# Patient Record
Sex: FEMALE | Race: BLACK OR AFRICAN AMERICAN | NOT HISPANIC OR LATINO | Employment: FULL TIME | URBAN - METROPOLITAN AREA
[De-identification: names, ages, dates, MRNs, and addresses within clinical notes are randomized per-mention and may not be internally consistent; named-entity substitution may affect disease eponyms.]

---

## 2020-01-07 ENCOUNTER — TELEPHONE (OUTPATIENT)
Dept: INTERNAL MEDICINE | Facility: CLINIC | Age: 35
End: 2020-01-07

## 2020-01-07 ENCOUNTER — OFFICE VISIT (OUTPATIENT)
Dept: INTERNAL MEDICINE | Facility: CLINIC | Age: 35
End: 2020-01-07
Payer: COMMERCIAL

## 2020-01-07 VITALS
SYSTOLIC BLOOD PRESSURE: 126 MMHG | WEIGHT: 170.88 LBS | DIASTOLIC BLOOD PRESSURE: 84 MMHG | HEART RATE: 67 BPM | OXYGEN SATURATION: 98 % | BODY MASS INDEX: 28.47 KG/M2 | HEIGHT: 65 IN | TEMPERATURE: 99 F

## 2020-01-07 DIAGNOSIS — Z00.00 ANNUAL PHYSICAL EXAM: Primary | ICD-10-CM

## 2020-01-07 LAB
BILIRUB UR QL STRIP: NEGATIVE
CLARITY UR REFRACT.AUTO: CLEAR
COLOR UR AUTO: YELLOW
GLUCOSE UR QL STRIP: NEGATIVE
HGB UR QL STRIP: NEGATIVE
KETONES UR QL STRIP: NEGATIVE
LEUKOCYTE ESTERASE UR QL STRIP: NEGATIVE
MICROSCOPIC COMMENT: NORMAL
NITRITE UR QL STRIP: NEGATIVE
PH UR STRIP: 7 [PH] (ref 5–8)
PROT UR QL STRIP: NEGATIVE
SP GR UR STRIP: 1.02 (ref 1–1.03)
URN SPEC COLLECT METH UR: NORMAL

## 2020-01-07 PROCEDURE — 99385 PR PREVENTIVE VISIT,NEW,18-39: ICD-10-PCS | Mod: S$GLB,,, | Performed by: INTERNAL MEDICINE

## 2020-01-07 PROCEDURE — 81001 URINALYSIS AUTO W/SCOPE: CPT

## 2020-01-07 PROCEDURE — 99999 PR PBB SHADOW E&M-NEW PATIENT-LVL III: ICD-10-PCS | Mod: PBBFAC,,, | Performed by: INTERNAL MEDICINE

## 2020-01-07 PROCEDURE — 99999 PR PBB SHADOW E&M-NEW PATIENT-LVL III: CPT | Mod: PBBFAC,,, | Performed by: INTERNAL MEDICINE

## 2020-01-07 PROCEDURE — 99385 PREV VISIT NEW AGE 18-39: CPT | Mod: S$GLB,,, | Performed by: INTERNAL MEDICINE

## 2020-01-07 RX ORDER — ETONOGESTREL AND ETHINYL ESTRADIOL VAGINAL RING .015; .12 MG/D; MG/D
1 RING VAGINAL
COMMUNITY
End: 2022-10-26 | Stop reason: CLARIF

## 2020-01-07 NOTE — PROGRESS NOTES
CC:  Annual exam    HPI:  The patient is a 34-year-old female comes today for annual physical exam.  Will not seen for several years.  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  Does report being under a great deal of stress.  She has reduced her hours.  Family life should improve the and basketball season.    ROS:  She does report some weight gain.  She did have her eyes checked 1 month ago at [facility].  No auditory changes.  No dysphagia.  No chest pain.  Her shortness of breath.  No nausea or vomiting.  No abdominal pain. No bowel changes.  No bladder changes.  No weakness in the arms or legs.  No skin changes.  No breast changes.  No numbness tingling in arms or legs.  She does report that she was treated urgent care for UTI.  They did see blood in her urine and was told to follow up on this her    Physical exam:  General appearance no acute distress  HEENT:  Conjunctiva is clear.  Pupils equal reactive.  TMs are clear.  Nasal septum is midline without discharge. Oropharynx is without erythema.  Trachea is midline without JVD. Without thyromegaly.  Pulmonary:  Good inspiratory, expiratory breath sounds are heard.  Lungs are clear to auscultation.  Cardiovascular:  S1-S2, rhythm appears to be normal.  2+ carotid pulses bruits.  Extremities without edema.  Abdomen was nontender, nondistended without hepatosplenomegaly.    Assessment:  1.  Annual exam  2.  History of UTI with hematuria.    Plan:  1.  Will schedule a CBC, CMP, lipid panel, UA

## 2020-01-07 NOTE — TELEPHONE ENCOUNTER
----- Message from Vicki Nogueira sent at 1/7/2020  9:16 AM CST -----  Patient went to main campus but should be here shortly.    Thank you

## 2020-01-13 ENCOUNTER — LAB VISIT (OUTPATIENT)
Dept: LAB | Facility: HOSPITAL | Age: 35
End: 2020-01-13
Attending: INTERNAL MEDICINE
Payer: COMMERCIAL

## 2020-01-13 DIAGNOSIS — Z00.00 ANNUAL PHYSICAL EXAM: ICD-10-CM

## 2020-01-13 LAB
ALBUMIN SERPL BCP-MCNC: 3.9 G/DL (ref 3.5–5.2)
ALP SERPL-CCNC: 54 U/L (ref 55–135)
ALT SERPL W/O P-5'-P-CCNC: 11 U/L (ref 10–44)
ANION GAP SERPL CALC-SCNC: 9 MMOL/L (ref 8–16)
AST SERPL-CCNC: 14 U/L (ref 10–40)
BASOPHILS # BLD AUTO: 0.01 K/UL (ref 0–0.2)
BASOPHILS NFR BLD: 0.2 % (ref 0–1.9)
BILIRUB SERPL-MCNC: 0.8 MG/DL (ref 0.1–1)
BUN SERPL-MCNC: 18 MG/DL (ref 6–20)
CALCIUM SERPL-MCNC: 9.1 MG/DL (ref 8.7–10.5)
CHLORIDE SERPL-SCNC: 105 MMOL/L (ref 95–110)
CHOLEST SERPL-MCNC: 172 MG/DL (ref 120–199)
CHOLEST/HDLC SERPL: 2.6 {RATIO} (ref 2–5)
CO2 SERPL-SCNC: 24 MMOL/L (ref 23–29)
CREAT SERPL-MCNC: 1 MG/DL (ref 0.5–1.4)
DIFFERENTIAL METHOD: ABNORMAL
EOSINOPHIL # BLD AUTO: 0 K/UL (ref 0–0.5)
EOSINOPHIL NFR BLD: 0.5 % (ref 0–8)
ERYTHROCYTE [DISTWIDTH] IN BLOOD BY AUTOMATED COUNT: 12.2 % (ref 11.5–14.5)
EST. GFR  (AFRICAN AMERICAN): >60 ML/MIN/1.73 M^2
EST. GFR  (NON AFRICAN AMERICAN): >60 ML/MIN/1.73 M^2
GLUCOSE SERPL-MCNC: 83 MG/DL (ref 70–110)
HCT VFR BLD AUTO: 42.3 % (ref 37–48.5)
HDLC SERPL-MCNC: 66 MG/DL (ref 40–75)
HDLC SERPL: 38.4 % (ref 20–50)
HGB BLD-MCNC: 13.5 G/DL (ref 12–16)
IMM GRANULOCYTES # BLD AUTO: 0.01 K/UL (ref 0–0.04)
IMM GRANULOCYTES NFR BLD AUTO: 0.2 % (ref 0–0.5)
LDLC SERPL CALC-MCNC: 85.2 MG/DL (ref 63–159)
LYMPHOCYTES # BLD AUTO: 2 K/UL (ref 1–4.8)
LYMPHOCYTES NFR BLD: 33.1 % (ref 18–48)
MCH RBC QN AUTO: 29.9 PG (ref 27–31)
MCHC RBC AUTO-ENTMCNC: 31.9 G/DL (ref 32–36)
MCV RBC AUTO: 94 FL (ref 82–98)
MONOCYTES # BLD AUTO: 0.4 K/UL (ref 0.3–1)
MONOCYTES NFR BLD: 7.1 % (ref 4–15)
NEUTROPHILS # BLD AUTO: 3.6 K/UL (ref 1.8–7.7)
NEUTROPHILS NFR BLD: 58.9 % (ref 38–73)
NONHDLC SERPL-MCNC: 106 MG/DL
NRBC BLD-RTO: 0 /100 WBC
PLATELET # BLD AUTO: 406 K/UL (ref 150–350)
PMV BLD AUTO: 10.6 FL (ref 9.2–12.9)
POTASSIUM SERPL-SCNC: 3.9 MMOL/L (ref 3.5–5.1)
PROT SERPL-MCNC: 7.5 G/DL (ref 6–8.4)
RBC # BLD AUTO: 4.51 M/UL (ref 4–5.4)
SODIUM SERPL-SCNC: 138 MMOL/L (ref 136–145)
TRIGL SERPL-MCNC: 104 MG/DL (ref 30–150)
WBC # BLD AUTO: 6.02 K/UL (ref 3.9–12.7)

## 2020-01-13 PROCEDURE — 80053 COMPREHEN METABOLIC PANEL: CPT

## 2020-01-13 PROCEDURE — 36415 COLL VENOUS BLD VENIPUNCTURE: CPT | Mod: PO

## 2020-01-13 PROCEDURE — 85025 COMPLETE CBC W/AUTO DIFF WBC: CPT

## 2020-01-13 PROCEDURE — 80061 LIPID PANEL: CPT

## 2020-01-14 DIAGNOSIS — D75.839 THROMBOCYTOSIS: Primary | ICD-10-CM

## 2020-01-15 ENCOUNTER — TELEPHONE (OUTPATIENT)
Dept: INTERNAL MEDICINE | Facility: CLINIC | Age: 35
End: 2020-01-15

## 2020-01-15 NOTE — TELEPHONE ENCOUNTER
Attempted to contact pt no success, left voice message.  Pt also need to scheduled blood test in 6 months CBC.

## 2020-01-15 NOTE — TELEPHONE ENCOUNTER
----- Message from Delryo Gray MD sent at 1/14/2020  5:48 PM CST -----  Please contact patient. Her blood tests looked good. The only abnormality was her platelet count. It was elevated at 405. It has been around this range since I had first seen her 14 years ago. It has remained stable. I would like to check another CBC in 6 months. Order is in.

## 2022-02-08 LAB
HUMAN PAPILLOMAVIRUS (HPV): NORMAL
PAP RECOMMENDATION EXT: NORMAL
PAP SMEAR: NORMAL

## 2022-09-27 ENCOUNTER — HOSPITAL ENCOUNTER (EMERGENCY)
Facility: HOSPITAL | Age: 37
Discharge: HOME | End: 2022-09-28
Attending: EMERGENCY MEDICINE
Payer: COMMERCIAL

## 2022-09-27 VITALS
WEIGHT: 160 LBS | HEIGHT: 65 IN | RESPIRATION RATE: 18 BRPM | BODY MASS INDEX: 26.66 KG/M2 | HEART RATE: 80 BPM | TEMPERATURE: 99 F | OXYGEN SATURATION: 99 % | DIASTOLIC BLOOD PRESSURE: 95 MMHG | SYSTOLIC BLOOD PRESSURE: 150 MMHG

## 2022-09-27 DIAGNOSIS — T14.8XXA FOREIGN BODY IN SKIN: Primary | ICD-10-CM

## 2022-09-27 DIAGNOSIS — S91.339A PENETRATING FOOT WOUND: ICD-10-CM

## 2022-09-27 DIAGNOSIS — T14.8XXA PUNCTURE WOUND: ICD-10-CM

## 2022-09-27 LAB
B-HCG UR QL: NEGATIVE
CTP QC/QA: YES

## 2022-09-27 PROCEDURE — 63600175 PHARM REV CODE 636 W HCPCS: Performed by: NURSE PRACTITIONER

## 2022-09-27 PROCEDURE — 81025 URINE PREGNANCY TEST: CPT | Performed by: EMERGENCY MEDICINE

## 2022-09-27 PROCEDURE — 90715 TDAP VACCINE 7 YRS/> IM: CPT | Performed by: NURSE PRACTITIONER

## 2022-09-27 PROCEDURE — 90471 IMMUNIZATION ADMIN: CPT | Performed by: NURSE PRACTITIONER

## 2022-09-27 PROCEDURE — 28192 REMOVAL OF FOOT FOREIGN BODY: CPT | Mod: RT

## 2022-09-27 PROCEDURE — 99284 EMERGENCY DEPT VISIT MOD MDM: CPT

## 2022-09-27 RX ADMIN — CLOSTRIDIUM TETANI TOXOID ANTIGEN (FORMALDEHYDE INACTIVATED), CORYNEBACTERIUM DIPHTHERIAE TOXOID ANTIGEN (FORMALDEHYDE INACTIVATED), BORDETELLA PERTUSSIS TOXOID ANTIGEN (GLUTARALDEHYDE INACTIVATED), BORDETELLA PERTUSSIS FILAMENTOUS HEMAGGLUTININ ANTIGEN (FORMALDEHYDE INACTIVATED), BORDETELLA PERTUSSIS PERTACTIN ANTIGEN, AND BORDETELLA PERTUSSIS FIMBRIAE 2/3 ANTIGEN 0.5 ML: 5; 2; 2.5; 5; 3; 5 INJECTION, SUSPENSION INTRAMUSCULAR at 11:09

## 2022-09-27 NOTE — Clinical Note
"Aram"Zach Alvares was seen and treated in our emergency department on 9/27/2022.  She may return to work on 10/01/2022.       If you have any questions or concerns, please don't hesitate to call.      Lucie Lundy NP"

## 2022-09-28 PROCEDURE — 28192 REMOVAL OF FOOT FOREIGN BODY: CPT | Mod: RT

## 2022-09-28 RX ORDER — MUPIROCIN 20 MG/G
OINTMENT TOPICAL 2 TIMES DAILY
Qty: 30 G | Refills: 0 | Status: SHIPPED | OUTPATIENT
Start: 2022-09-28 | End: 2022-10-08

## 2022-09-28 RX ORDER — LIDOCAINE HYDROCHLORIDE 10 MG/ML
10 INJECTION, SOLUTION EPIDURAL; INFILTRATION; INTRACAUDAL; PERINEURAL ONCE
Status: DISCONTINUED | OUTPATIENT
Start: 2022-09-28 | End: 2022-09-28 | Stop reason: HOSPADM

## 2022-09-28 RX ORDER — DOXYCYCLINE 100 MG/1
100 CAPSULE ORAL 2 TIMES DAILY
Qty: 20 CAPSULE | Refills: 0 | Status: SHIPPED | OUTPATIENT
Start: 2022-09-28 | End: 2022-10-08

## 2022-09-28 NOTE — PROGRESS NOTES
Patient is aware that she has some retained foreign bodies please ensure that she is aware of this x-ray report and that she has proper follow-up with podiatry

## 2022-09-28 NOTE — ED PROVIDER NOTES
Encounter Date: 2022       History     Chief Complaint   Patient presents with    Foreign Body in Skin     Pt was snorkeling and thinks she may have stepped on a sea urchin on right foot     37-year-old female presents to the ER with concerns for multiple retained foreign bodies in her right lateral and plantar side foot since yesterday afternoon when snorkeling in [facility].  She states she was snorkeling in the see water, and was trying to get away from a shallow area in accidentally agrees to her foot by ACR chin.  She states she immediately noticed pain and noticed multiple typical stuck in her foot.  She is unable to remove it.  She attempted to put vinegar on her foot without relief of her pain. She states she continues to have pain especially when trying to bear weight and walk and is concerned that there is retained tender colles of this ER urgent stuck in her foot.  She reports no fever no other complaints or concerns.  She is unsure when her last tetanus injection was.    Review of patient's allergies indicates:  No Known Allergies  History reviewed. No pertinent past medical history.  Past Surgical History:   Procedure Laterality Date    BREAST SURGERY Bilateral     Saline     SECTION       x 2  .  and      Family History   Problem Relation Age of Onset    Hypertension Mother     Diabetes Mother     Cancer Paternal Aunt         Colon     Cancer Paternal Grandmother         Colon cancer     Social History     Tobacco Use    Smoking status: Never    Smokeless tobacco: Never   Substance Use Topics    Alcohol use: Yes     Comment: Social    Drug use: Never     Review of Systems   Constitutional:  Negative for chills, diaphoresis, fatigue and fever.   HENT:  Negative for congestion, postnasal drip, rhinorrhea, sinus pressure, sinus pain, sneezing, sore throat and trouble swallowing.    Eyes:  Negative for photophobia and visual disturbance.   Respiratory:  Negative for cough,  choking, chest tightness, shortness of breath and wheezing.    Cardiovascular:  Negative for chest pain, palpitations and leg swelling.   Gastrointestinal:  Negative for abdominal pain, constipation, diarrhea, nausea and vomiting.   Endocrine: Negative for polydipsia, polyphagia and polyuria.   Genitourinary:  Negative for dysuria.   Musculoskeletal:  Positive for gait problem. Negative for arthralgias, back pain, joint swelling and myalgias.   Skin:  Positive for wound. Negative for rash.   Allergic/Immunologic: Negative for immunocompromised state.   Neurological:  Negative for dizziness and weakness.   Hematological:  Does not bruise/bleed easily.   Psychiatric/Behavioral:  Negative for agitation and confusion.    All other systems reviewed and are negative.    Physical Exam     Initial Vitals [09/27/22 2208]   BP Pulse Resp Temp SpO2   (!) 150/95 80 18 98.5 °F (36.9 °C) 99 %      MAP       --         Physical Exam    Nursing note and vitals reviewed.  Constitutional: She appears well-developed and well-nourished. She is not diaphoretic. No distress.   HENT:   Head: Normocephalic and atraumatic.   Eyes: Conjunctivae are normal.   Neck:   Normal range of motion.  Cardiovascular:  Normal rate.           Pulmonary/Chest: Breath sounds normal.   Abdominal: Abdomen is soft.   Musculoskeletal:         General: Tenderness present.      Cervical back: Normal range of motion.      Left foot: Tenderness present. No bony tenderness.        Feet:      Neurological: She is alert and oriented to person, place, and time. She has normal strength. GCS score is 15. GCS eye subscore is 4. GCS verbal subscore is 5. GCS motor subscore is 6.   Skin: Skin is warm and dry. Capillary refill takes less than 2 seconds. No rash noted. No erythema.   Psychiatric: She has a normal mood and affect. Thought content normal.       ED Course   Foreign Body    Date/Time: 9/28/2022 1:02 AM  Performed by: Lucie Lundy NP  Authorized by: Silvia  JONI Elizalde MD   Consent Done: Emergent Situation  Body area: skin  General location: lower extremity  Location details: right foot  Anesthesia: see MAR for details  Patient restrained: no  Patient cooperative: yes  Localization method: visualized and serial x-rays  Removal mechanism: scalpel, irrigation, forceps and hemostat  Dressing: antibiotic ointment and dressing applied  Tendon involvement: none  Depth: deep  Complexity: complex  Post-procedure assessment: residual foreign bodies remain  Patient tolerance: Patient tolerated the procedure well with no immediate complications    Labs Reviewed   POCT URINE PREGNANCY          Imaging Results              X-Ray Foot Complete Right (In process)  Result time 09/27/22 23:45:40                  X-Rays:   Independently Interpreted Readings:   Other Readings:  X-ray right foot reveals radiopaque linear foreign bodies x3 in the right plantar surface of the foot consistent with spines from a sea urchin  Medications   Tdap vaccine injection 0.5 mL (0.5 mLs Intramuscular Given 9/27/22 6231)     Medical Decision Making:   Independently Interpreted Test(s):   I have ordered and independently interpreted X-rays - see prior notes.  Clinical Tests:   Lab Tests: Ordered and Reviewed  The following lab test(s) were unremarkable: UPT  Radiological Study: Ordered and Reviewed  ED Management:  Attending Note:    I discussed the patient's care with Advanced Practice Clinician.  I reviewed their note and agree with the history, physical, assessment, diagnosis, treatment, all procedures performed, xray and EKG interpretations and discharge plan provided by the Advanced Practice Clinician. My overall impression is foreign body in skin of right foot.  Patient reports that while on vacation she stepped on what she believes was a CR chin and ended up with foreign bodies within the soft tissue of the foot.  A PP performed foreign body removal of visible see her chin spines but was unable to  completely remove all of the foreign bodies.  Patient was placed on doxycycline and given a prescription for Bactroban ointment, had tetanus vaccine updated and discharged home.  The patient has been instructed to follow up with their physician or the one provided as well as specific return precautions.   Silvia Elizalde M.D. 9/28/2022 3:20 AM                            Clinical Impression:   Final diagnoses:  [S91.339A] Penetrating foot wound  [T14.8XXA] Foreign body in skin (Primary)  [T14.8XXA] Puncture wound        ED Disposition Condition    Discharge Stable          ED Prescriptions       Medication Sig Dispense Start Date End Date Auth. Provider    doxycycline (VIBRAMYCIN) 100 MG Cap Take 1 capsule (100 mg total) by mouth 2 (two) times daily. for 10 days 20 capsule 9/28/2022 10/8/2022 Lucie Lundy NP    mupirocin (BACTROBAN) 2 % ointment Apply topically 2 (two) times daily. for 10 days 30 g 9/28/2022 10/8/2022 Lucie Lundy NP          Follow-up Information       Follow up With Specialties Details Why Contact Info Additional Information    [facility]  Schedule an appointment as soon as possible for a visit in 2 days for ER visit follow up and re-evaluation [address]  [facility] [address]  [phone]     [facility], [facility] - Emergency Dept Emergency Medicine Go to  As needed, If symptoms worsen [address] [address]  [phone] 1st floor    Dinh Huang DPM Podiatry, Surgery Schedule an appointment as soon as possible for a visit in 1 day for ER visit follow up and re-evaluation, podiatry [address]  [address]  [place] [address]  [phone]                Lucie Lundy NP  09/28/22 9195

## 2022-10-04 ENCOUNTER — HOSPITAL ENCOUNTER (OUTPATIENT)
Dept: RADIOLOGY | Facility: CLINIC | Age: 37
Discharge: HOME | End: 2022-10-04
Attending: PODIATRIST
Payer: COMMERCIAL

## 2022-10-04 ENCOUNTER — OFFICE VISIT (OUTPATIENT)
Dept: PODIATRY | Facility: CLINIC | Age: 37
End: 2022-10-04
Payer: COMMERCIAL

## 2022-10-04 VITALS
HEART RATE: 64 BPM | WEIGHT: 164 LBS | HEIGHT: 65 IN | RESPIRATION RATE: 16 BRPM | BODY MASS INDEX: 27.32 KG/M2 | OXYGEN SATURATION: 99 %

## 2022-10-04 DIAGNOSIS — M79.671 RIGHT FOOT PAIN: ICD-10-CM

## 2022-10-04 DIAGNOSIS — S90.851A FOREIGN BODY IN RIGHT FOOT, INITIAL ENCOUNTER: Primary | ICD-10-CM

## 2022-10-04 PROCEDURE — 1160F RVW MEDS BY RX/DR IN RCRD: CPT | Mod: CPTII,S$GLB,, | Performed by: PODIATRIST

## 2022-10-04 PROCEDURE — 99203 PR OFFICE/OUTPT VISIT, NEW, LEVL III, 30-44 MIN: ICD-10-PCS | Mod: S$GLB,,, | Performed by: PODIATRIST

## 2022-10-04 PROCEDURE — 99203 OFFICE O/P NEW LOW 30 MIN: CPT | Mod: S$GLB,,, | Performed by: PODIATRIST

## 2022-10-04 PROCEDURE — 3008F PR BODY MASS INDEX (BMI) DOCUMENTED: ICD-10-PCS | Mod: CPTII,S$GLB,, | Performed by: PODIATRIST

## 2022-10-04 PROCEDURE — 1159F PR MEDICATION LIST DOCUMENTED IN MEDICAL RECORD: ICD-10-PCS | Mod: CPTII,S$GLB,, | Performed by: PODIATRIST

## 2022-10-04 PROCEDURE — 3008F BODY MASS INDEX DOCD: CPT | Mod: CPTII,S$GLB,, | Performed by: PODIATRIST

## 2022-10-04 PROCEDURE — 1160F PR REVIEW ALL MEDS BY PRESCRIBER/CLIN PHARMACIST DOCUMENTED: ICD-10-PCS | Mod: CPTII,S$GLB,, | Performed by: PODIATRIST

## 2022-10-04 PROCEDURE — 1159F MED LIST DOCD IN RCRD: CPT | Mod: CPTII,S$GLB,, | Performed by: PODIATRIST

## 2022-10-04 PROCEDURE — 73630 XR FOOT COMPLETE 3 VIEW RIGHT: ICD-10-PCS | Mod: RT,S$GLB,, | Performed by: RADIOLOGY

## 2022-10-04 PROCEDURE — 73630 X-RAY EXAM OF FOOT: CPT | Mod: RT,S$GLB,, | Performed by: RADIOLOGY

## 2022-10-04 NOTE — PROGRESS NOTES
"  [address] [place] [address]  Phone: [phone]   [phone]    Patient's PCP:Primary Doctor No  Referring Provider: Aaareferral Self    Subjective:      Chief Complaint:: Foot Pain (Foreign Body in Right Foot)    Foot Pain  Associated symptoms include joint swelling. Pertinent negatives include no abdominal pain, arthralgias, chest pain, chills, coughing, fatigue, fever, headaches, myalgias, nausea, numbness, rash or weakness.   Aram Alvares is a 37 y.o. female who presents today with a complaint of right foot pain and foreign body in foot lasting for about a week. Onset of symptoms snorkeling and was stung by sea urchin  and reports trauma.  Current symptoms include burning and pain.  Aggravating factors are walking. Symptoms have gotten better. Treatment to date have included evaluation in the ED. patient had multiple urgent spines removed.  She states she is unsure if there are any remaining.    Vitals:    10/04/22 1542   Pulse: 64   Resp: 16   SpO2: 99%   Weight: 74.4 kg (164 lb)   Height: 5' 5" (1.651 m)   PainSc: 0-No pain      Shoe Size: 8.5-9    Past Surgical History:   Procedure Laterality Date    BREAST SURGERY Bilateral     Saline     SECTION       x 2  .  and      History reviewed. No pertinent past medical history.  Family History   Problem Relation Age of Onset    Hypertension Mother     Diabetes Mother     Cancer Paternal Aunt         Colon     Cancer Paternal Grandmother         Colon cancer        Social History:   Marital Status:   Alcohol History:  reports current alcohol use.  Tobacco History:  reports that she has never smoked. She has never used smokeless tobacco.  Drug History:  reports no history of drug use.    Review of patient's allergies indicates:  No Known Allergies    Current Outpatient Medications   Medication Sig Dispense Refill    doxycycline (VIBRAMYCIN) 100 MG Cap Take 1 capsule (100 mg total) by mouth 2 " (two) times daily. for 10 days 20 capsule 0    etonogestrel-ethinyl estradiol (NUVARING) 0.12-0.015 mg/24 hr vaginal ring Place 1 each vaginally every 28 days.      mupirocin (BACTROBAN) 2 % ointment Apply topically 2 (two) times daily. for 10 days 30 g 0     No current facility-administered medications for this visit.       Review of Systems   Constitutional:  Negative for chills, fatigue, fever and unexpected weight change.   HENT:  Negative for hearing loss and trouble swallowing.    Eyes:  Negative for photophobia and visual disturbance.   Respiratory:  Negative for cough, shortness of breath and wheezing.    Cardiovascular:  Negative for chest pain, palpitations and leg swelling.   Gastrointestinal:  Negative for abdominal pain and nausea.   Genitourinary:  Negative for dysuria and frequency.   Musculoskeletal:  Positive for joint swelling. Negative for arthralgias, back pain, gait problem and myalgias.   Skin:  Negative for rash and wound.   Neurological:  Negative for tremors, seizures, weakness, numbness and headaches.   Hematological:  Does not bruise/bleed easily.       Objective:        Physical Exam:   Foot Exam    General  General Appearance: appears stated age and healthy   Orientation: alert and oriented to person, place, and time   Affect: appropriate   Gait: antalgic       Right Foot/Ankle     Inspection and Palpation  Ecchymosis: none  Tenderness: fifth metatarsal base tenderness   Swelling: fifth metatarsal base   Arch: normal  Skin Exam: skin changes; no drainage, no ulcer and no erythema   Neurovascular  Dorsalis pedis: 2+  Posterior tibial: 2+  Capillary Refill: 2+  Varicose veins: not present  Saphenous nerve sensation: normal  Tibial nerve sensation: normal  Superficial peroneal nerve sensation: normal  Deep peroneal nerve sensation: normal  Sural nerve sensation: normal    Edema  Type of edema: non-pitting    Muscle Strength  Ankle dorsiflexion: 5  Ankle plantar flexion: 5  Ankle inversion:  5  Ankle eversion: 5  Great toe extension: 5  Great toe flexion: 5    Range of Motion    Normal right ankle ROM    Tests  Anterior drawer: negative   Talar tilt: negative   PT Tinel's sign: negative    Paresthesia: negative      Physical Exam  Cardiovascular:      Pulses:           Dorsalis pedis pulses are 2+ on the right side.        Posterior tibial pulses are 2+ on the right side.   Musculoskeletal:        Feet:    Feet:      Right foot:      Skin integrity: No ulcer or erythema.             Right Ankle/Foot Exam     Swelling   The patient is swollen on the fifth metatarsal base.    Range of Motion   The patient has normal right ankle ROM.        Muscle Strength   Right Lower Extremity   Ankle Dorsiflexion:  5   Plantar flexion:  5/5    Vascular Exam     Right Pulses  Dorsalis Pedis:      2+  Posterior Tibial:      2+         Imaging: X-Ray Foot Complete Right  Narrative: EXAMINATION:  XR FOOT COMPLETE 3 VIEW RIGHT    CLINICAL HISTORY:  . Pain in right foot    TECHNIQUE:  AP, lateral, and oblique views of the right foot were performed.    COMPARISON:  None    FINDINGS:  Degenerative changes are noted at the 1st metatarsophalangeal joint.  Juxta-articular bone erosion or Osteoporosis is not seen.  An acute fracture or soft tissue swelling is not noted.  Impression: Degenerative changes at the 1st metatarsophalangeal joint otherwise negative right foot x-rays    Electronically signed by: Yemi Karimi MD  Date:    10/04/2022  Time:    16:23    On my personal review of the radiographs there is a questionable radiopaque area at the signed patient is puncture wound site.         Assessment:       1. Foreign body in right foot, initial encounter    2. Right foot pain      Plan:   Foreign body in right foot, initial encounter    Right foot pain  -     X-Ray Foot Complete Right    Follow up if symptoms worsen or fail to improve.    Procedures        I discussed with the patient that based on her physical exam and  x-ray findings it is possible she still has retained foreign bodies.  As her symptoms are slowly improving I recommend that we continue with conservative management and see how her foot progresses over the next week.  I discussed there that if her symptoms do not continue to improve then I will order an outpatient ultrasound and we will make plans for excision of any remaining foreign bodies.  Recommend that she finish her course of doxycycline prescribed by the ED.    Counseling:     I provided patient education verbally regarding:   Patient diagnosis, treatment options, as well as alternatives, risks, and benefits.     This note was created using Dragon voice recognition software that occasionally misinterpreted phrases or words.

## 2022-10-27 PROBLEM — N93.9 ABNORMAL UTERINE BLEEDING: Status: ACTIVE | Noted: 2022-10-27

## 2024-11-14 ENCOUNTER — LAB VISIT (OUTPATIENT)
Dept: LAB | Facility: HOSPITAL | Age: 39
End: 2024-11-14
Attending: STUDENT IN AN ORGANIZED HEALTH CARE EDUCATION/TRAINING PROGRAM
Payer: COMMERCIAL

## 2024-11-14 ENCOUNTER — OFFICE VISIT (OUTPATIENT)
Dept: FAMILY MEDICINE | Facility: CLINIC | Age: 39
End: 2024-11-14
Payer: COMMERCIAL

## 2024-11-14 VITALS
SYSTOLIC BLOOD PRESSURE: 126 MMHG | DIASTOLIC BLOOD PRESSURE: 84 MMHG | BODY MASS INDEX: 24.61 KG/M2 | HEART RATE: 94 BPM | OXYGEN SATURATION: 99 % | HEIGHT: 65 IN | RESPIRATION RATE: 16 BRPM | WEIGHT: 147.69 LBS

## 2024-11-14 DIAGNOSIS — Z11.3 SCREENING FOR STD (SEXUALLY TRANSMITTED DISEASE): ICD-10-CM

## 2024-11-14 DIAGNOSIS — Z80.0 FAMILY HISTORY OF COLON CANCER: ICD-10-CM

## 2024-11-14 DIAGNOSIS — Z00.00 ROUTINE GENERAL MEDICAL EXAMINATION AT A HEALTH CARE FACILITY: Primary | ICD-10-CM

## 2024-11-14 DIAGNOSIS — Z00.00 ROUTINE GENERAL MEDICAL EXAMINATION AT A HEALTH CARE FACILITY: ICD-10-CM

## 2024-11-14 LAB
ALBUMIN SERPL BCP-MCNC: 4.2 G/DL (ref 3.5–5.2)
ALP SERPL-CCNC: 45 U/L (ref 40–150)
ALT SERPL W/O P-5'-P-CCNC: 9 U/L (ref 10–44)
ANION GAP SERPL CALC-SCNC: 8 MMOL/L (ref 8–16)
AST SERPL-CCNC: 14 U/L (ref 10–40)
BASOPHILS # BLD AUTO: 0.03 K/UL (ref 0–0.2)
BASOPHILS NFR BLD: 0.5 % (ref 0–1.9)
BILIRUB SERPL-MCNC: 1.6 MG/DL (ref 0.1–1)
BUN SERPL-MCNC: 15 MG/DL (ref 6–20)
CALCIUM SERPL-MCNC: 8.9 MG/DL (ref 8.7–10.5)
CHLORIDE SERPL-SCNC: 107 MMOL/L (ref 95–110)
CHOLEST SERPL-MCNC: 143 MG/DL (ref 120–199)
CHOLEST/HDLC SERPL: 2.6 {RATIO} (ref 2–5)
CO2 SERPL-SCNC: 22 MMOL/L (ref 23–29)
CREAT SERPL-MCNC: 1 MG/DL (ref 0.5–1.4)
DIFFERENTIAL METHOD BLD: ABNORMAL
EOSINOPHIL # BLD AUTO: 0 K/UL (ref 0–0.5)
EOSINOPHIL NFR BLD: 0.2 % (ref 0–8)
ERYTHROCYTE [DISTWIDTH] IN BLOOD BY AUTOMATED COUNT: 11.9 % (ref 11.5–14.5)
EST. GFR  (NO RACE VARIABLE): >60 ML/MIN/1.73 M^2
ESTIMATED AVG GLUCOSE: 85 MG/DL (ref 68–131)
GLUCOSE SERPL-MCNC: 73 MG/DL (ref 70–110)
HBA1C MFR BLD: 4.6 % (ref 4–5.6)
HCT VFR BLD AUTO: 39.9 % (ref 37–48.5)
HDLC SERPL-MCNC: 56 MG/DL (ref 40–75)
HDLC SERPL: 39.2 % (ref 20–50)
HGB BLD-MCNC: 13 G/DL (ref 12–16)
IMM GRANULOCYTES # BLD AUTO: 0.06 K/UL (ref 0–0.04)
IMM GRANULOCYTES NFR BLD AUTO: 1 % (ref 0–0.5)
LDLC SERPL CALC-MCNC: 75.6 MG/DL (ref 63–159)
LYMPHOCYTES # BLD AUTO: 2.2 K/UL (ref 1–4.8)
LYMPHOCYTES NFR BLD: 36.7 % (ref 18–48)
MCH RBC QN AUTO: 30.9 PG (ref 27–31)
MCHC RBC AUTO-ENTMCNC: 32.6 G/DL (ref 32–36)
MCV RBC AUTO: 95 FL (ref 82–98)
MONOCYTES # BLD AUTO: 0.3 K/UL (ref 0.3–1)
MONOCYTES NFR BLD: 5.4 % (ref 4–15)
NEUTROPHILS # BLD AUTO: 3.3 K/UL (ref 1.8–7.7)
NEUTROPHILS NFR BLD: 56.2 % (ref 38–73)
NONHDLC SERPL-MCNC: 87 MG/DL
NRBC BLD-RTO: 0 /100 WBC
PLATELET # BLD AUTO: 404 K/UL (ref 150–450)
PMV BLD AUTO: 10.1 FL (ref 9.2–12.9)
POTASSIUM SERPL-SCNC: 4 MMOL/L (ref 3.5–5.1)
PROT SERPL-MCNC: 7.3 G/DL (ref 6–8.4)
RBC # BLD AUTO: 4.21 M/UL (ref 4–5.4)
SODIUM SERPL-SCNC: 137 MMOL/L (ref 136–145)
TRIGL SERPL-MCNC: 57 MG/DL (ref 30–150)
WBC # BLD AUTO: 5.89 K/UL (ref 3.9–12.7)

## 2024-11-14 PROCEDURE — 36415 COLL VENOUS BLD VENIPUNCTURE: CPT | Mod: PO | Performed by: STUDENT IN AN ORGANIZED HEALTH CARE EDUCATION/TRAINING PROGRAM

## 2024-11-14 PROCEDURE — 99395 PREV VISIT EST AGE 18-39: CPT | Mod: S$GLB,,, | Performed by: STUDENT IN AN ORGANIZED HEALTH CARE EDUCATION/TRAINING PROGRAM

## 2024-11-14 PROCEDURE — 3008F BODY MASS INDEX DOCD: CPT | Mod: CPTII,S$GLB,, | Performed by: STUDENT IN AN ORGANIZED HEALTH CARE EDUCATION/TRAINING PROGRAM

## 2024-11-14 PROCEDURE — 83036 HEMOGLOBIN GLYCOSYLATED A1C: CPT | Performed by: STUDENT IN AN ORGANIZED HEALTH CARE EDUCATION/TRAINING PROGRAM

## 2024-11-14 PROCEDURE — 84439 ASSAY OF FREE THYROXINE: CPT | Performed by: STUDENT IN AN ORGANIZED HEALTH CARE EDUCATION/TRAINING PROGRAM

## 2024-11-14 PROCEDURE — 80074 ACUTE HEPATITIS PANEL: CPT | Performed by: STUDENT IN AN ORGANIZED HEALTH CARE EDUCATION/TRAINING PROGRAM

## 2024-11-14 PROCEDURE — 1160F RVW MEDS BY RX/DR IN RCRD: CPT | Mod: CPTII,S$GLB,, | Performed by: STUDENT IN AN ORGANIZED HEALTH CARE EDUCATION/TRAINING PROGRAM

## 2024-11-14 PROCEDURE — 85025 COMPLETE CBC W/AUTO DIFF WBC: CPT | Performed by: STUDENT IN AN ORGANIZED HEALTH CARE EDUCATION/TRAINING PROGRAM

## 2024-11-14 PROCEDURE — 3079F DIAST BP 80-89 MM HG: CPT | Mod: CPTII,S$GLB,, | Performed by: STUDENT IN AN ORGANIZED HEALTH CARE EDUCATION/TRAINING PROGRAM

## 2024-11-14 PROCEDURE — 84443 ASSAY THYROID STIM HORMONE: CPT | Performed by: STUDENT IN AN ORGANIZED HEALTH CARE EDUCATION/TRAINING PROGRAM

## 2024-11-14 PROCEDURE — 80061 LIPID PANEL: CPT | Performed by: STUDENT IN AN ORGANIZED HEALTH CARE EDUCATION/TRAINING PROGRAM

## 2024-11-14 PROCEDURE — 1159F MED LIST DOCD IN RCRD: CPT | Mod: CPTII,S$GLB,, | Performed by: STUDENT IN AN ORGANIZED HEALTH CARE EDUCATION/TRAINING PROGRAM

## 2024-11-14 PROCEDURE — 86593 SYPHILIS TEST NON-TREP QUANT: CPT | Performed by: STUDENT IN AN ORGANIZED HEALTH CARE EDUCATION/TRAINING PROGRAM

## 2024-11-14 PROCEDURE — 99999 PR PBB SHADOW E&M-EST. PATIENT-LVL III: CPT | Mod: PBBFAC,,, | Performed by: STUDENT IN AN ORGANIZED HEALTH CARE EDUCATION/TRAINING PROGRAM

## 2024-11-14 PROCEDURE — 80053 COMPREHEN METABOLIC PANEL: CPT | Performed by: STUDENT IN AN ORGANIZED HEALTH CARE EDUCATION/TRAINING PROGRAM

## 2024-11-14 PROCEDURE — 3074F SYST BP LT 130 MM HG: CPT | Mod: CPTII,S$GLB,, | Performed by: STUDENT IN AN ORGANIZED HEALTH CARE EDUCATION/TRAINING PROGRAM

## 2024-11-14 PROCEDURE — 87389 HIV-1 AG W/HIV-1&-2 AB AG IA: CPT | Performed by: STUDENT IN AN ORGANIZED HEALTH CARE EDUCATION/TRAINING PROGRAM

## 2024-11-14 NOTE — PROGRESS NOTES
[facility]  Office Visit Note     SUBJECTIVE:   HPI: Aram Alvares  is a 39 y.o. female     History of Present Illness    CHIEF COMPLAINT:  Patient presents today for establishing care and routine check-up.    GYNECOLOGICAL HISTORY:  She underwent endometrial ablation for heavy bleeding and still has a cycle, but it is not as heavy as before. Her last menstrual period was approximately two weeks ago, around . Her last Pap smear was performed around the time of her ablation. She has a history of an abnormal Pap smear and underwent colposcopy once. However, following pap smears were all within normal limits    OBSTETRIC HISTORY:  She has had two previous full-term pregnancies, both resulting in C-sections in  and .    Ob/gyn provider: Dr. Rachel Vazquez     SURGICAL HISTORY:  She has undergone endometrial ablation and breast augmentation.    FAMILY MEDICAL HISTORY:  She reports a family history of diabetes in her mother. There is a history of colon cancer on her father's side, with her paternal aunt diagnosed in her early 50s and grandmother in her mid-60s to 70 years. Her father passed away at age 34 with no known history of colon cancer.    SOCIAL HISTORY:  She is  with two children and works as a pharmacist. She is sexually active with her spouse and denies any history of sexually transmitted diseases.    PREVENTIVE CARE:  She has regular dental check-ups every 6 months and had a recent eye exam a few months ago. She wears both contacts and glasses.         No visits with results within 6 Month(s) from this visit.   Latest known visit with results is:   Admission on 10/27/2022, Discharged on 10/27/2022   Component Date Value Ref Range Status    WBC 10/27/2022 5.08  3.90 - 12.70 K/uL Final    RBC 10/27/2022 4.49  4.00 - 5.40 M/uL Final    Hemoglobin 10/27/2022 14.0  12.0 - 16.0 g/dL Final    Hematocrit 10/27/2022 42.1  37.0 - 48.5 % Final    MCV 10/27/2022  94  82 - 98 fL Final    MCH 10/27/2022 31.2 (H)  27.0 - 31.0 pg Final    MCHC 10/27/2022 33.3  32.0 - 36.0 g/dL Final    RDW 10/27/2022 12.1  11.5 - 14.5 % Final    Platelets 10/27/2022 398  150 - 450 K/uL Final    MPV 10/27/2022 10.4  9.2 - 12.9 fL Final    POC Preg Test, Ur 10/27/2022 Negative  Negative Final     Acceptable 10/27/2022 Yes   Final         No current outpatient medications on file prior to visit.     Current Facility-Administered Medications on File Prior to Visit   Medication Dose Route Frequency Provider Last Rate Last Admin    ceFAZolin injection 2 g  2 g Intravenous On Call Procedure Rachel Vazquez MD   2 g at 10/27/22 0726    HYDROmorphone injection 0.5 mg  0.5 mg Intravenous Q15 Min PRN Emelia You MD        lactated ringers infusion   Intravenous Continuous TessyDuy MD   Stopped at 10/27/22 0737    ondansetron injection 4 mg  4 mg Intravenous Daily PRN Emelia You MD        oxyCODONE-acetaminophen 5-325 mg per tablet 1 tablet  1 tablet Oral Q3H PRN Emelia You MD         Past Medical History:   Diagnosis Date    General anesthetics causing adverse effect in therapeutic use     hard to wake up     Past Surgical History:   Procedure Laterality Date    BREAST SURGERY Bilateral 2015    Saline     SECTION       x 2  .  and     DILATION AND CURETTAGE OF UTERUS N/A 10/27/2022    Procedure: DILATION AND CURETTAGE, UTERUS;  Surgeon: Rachel Vazquez MD;  Location: [facility] OR;  Service: OB/GYN;  Laterality: N/A;    THERMAL ABLATION OF ENDOMETRIUM USING HYSTEROSCOPY N/A 10/27/2022    Procedure: ABLATION, ENDOMETRIUM, THERMAL,;  Surgeon: Rachel Vazquez MD;  Location: [facility] OR;  Service: OB/GYN;  Laterality: N/A;     Past Surgical History:   Procedure Laterality Date    BREAST SURGERY Bilateral     Saline     SECTION       x 2  .  and     DILATION AND CURETTAGE OF UTERUS N/A 10/27/2022    Procedure:  DILATION AND CURETTAGE, UTERUS;  Surgeon: Rachel Vazquez MD;  Location: [facility] OR;  Service: OB/GYN;  Laterality: N/A;    THERMAL ABLATION OF ENDOMETRIUM USING HYSTEROSCOPY N/A 10/27/2022    Procedure: ABLATION, ENDOMETRIUM, THERMAL,;  Surgeon: Rachel Vazquez MD;  Location: [facility] OR;  Service: OB/GYN;  Laterality: N/A;     Family History   Problem Relation Name Age of Onset    Hypertension Mother Jennifer     Diabetes Mother Jennifer     Alcohol abuse Father Yanet Garcia     Learning disabilities Son Abe     Mental illness Maternal Uncle Julito     Cancer Paternal Aunt Lyn         Colon ago 40s    Cancer Paternal Grandmother Sally         Colon cancer       Marital Status:   Alcohol History:  reports current alcohol use of about 4.0 standard drinks of alcohol per week.  Tobacco History:  reports that she has never smoked. She has never been exposed to tobacco smoke. She has never used smokeless tobacco.  Drug History:  reports no history of drug use.    Health Maintenance Topics with due status: Not Due       Topic Last Completion Date    TETANUS VACCINE 09/27/2022    RSV Vaccine (Age 60+ and Pregnant patients) Not Due     Immunization History   Administered Date(s) Administered    Tdap 09/27/2022       Review of patient's allergies indicates:  No Known Allergies  No current outpatient medications on file.  No current facility-administered medications for this visit.    Facility-Administered Medications Ordered in Other Visits:     ceFAZolin injection 2 g, 2 g, Intravenous, On Call Procedure, Rachel Vazquez MD, 2 g at 10/27/22 0726    HYDROmorphone injection 0.5 mg, 0.5 mg, Intravenous, Q15 Min PRN, Emelia You MD    lactated ringers infusion, , Intravenous, Continuous, TessyDuy ortega MD, Stopped at 10/27/22 0737    ondansetron injection 4 mg, 4 mg, Intravenous, Daily PRN, Emelia You MD    oxyCODONE-acetaminophen 5-325 mg per tablet 1 tablet, 1 tablet, Oral, Q3H PRN, Kenyatta  "Emelia NORMAN MD    Review of Systems   Constitutional:  Negative for chills and fever.   Cardiovascular:  Negative for chest pain.   Gastrointestinal:  Negative for abdominal distention, blood in stool, nausea and vomiting.   Genitourinary:  Negative for menstrual problem.       OBJECTIVE:      Vitals:    11/14/24 1318   BP: 126/84   BP Location: Right arm   Patient Position: Sitting   Pulse: 94   Resp: 16   SpO2: 99%   Weight: 67 kg (147 lb 11.3 oz)   Height: 5' 5" (1.651 m)     Physical Exam  Constitutional:       General: She is not in acute distress.     Appearance: She is not ill-appearing or toxic-appearing.   HENT:      Head: Normocephalic and atraumatic.      Mouth/Throat:      Mouth: Mucous membranes are moist.      Pharynx: Uvula midline. No pharyngeal swelling.   Cardiovascular:      Rate and Rhythm: Normal rate and regular rhythm.   Pulmonary:      Effort: Pulmonary effort is normal. No tachypnea, bradypnea, accessory muscle usage, prolonged expiration or respiratory distress.      Breath sounds: Normal breath sounds. No stridor. No wheezing, rhonchi or rales.   Abdominal:      General: Bowel sounds are normal. There is no distension.      Palpations: Abdomen is soft.      Tenderness: There is no abdominal tenderness. There is no guarding or rebound.   Neurological:      General: No focal deficit present.      Mental Status: She is alert.   Psychiatric:         Mood and Affect: Mood normal.         Behavior: Behavior normal.        Assessment:       1. Routine general medical examination at a health care facility    2. Screening for STD (sexually transmitted disease)    3. Family history of colon cancer        Plan:       Routine general medical examination at a health care facility  -     CBC Auto Differential; Future; Expected date: 11/14/2024  -     Comprehensive Metabolic Panel; Future; Expected date: 11/14/2024  -     Hemoglobin A1C; Future; Expected date: 11/14/2024  -     Lipid Panel; Future; " Expected date: 11/14/2024  -     TSH; Future; Expected date: 11/14/2024  -     T4, Free; Future; Expected date: 11/14/2024  GENERAL ADULT MEDICAL EXAMINATION:  Evaluated need for routine health maintenance, including Pap smear and lab work.  Baseline lab work today including STD screening     Screening for STD (sexually transmitted disease)  -     Treponema Pallidium Antibodies IgG, IgM; Future; Expected date: 11/14/2024  -     Hepatitis Panel, Acute; Future; Expected date: 11/14/2024  -     HIV 1/2 Ag/Ab (4th Gen); Future; Expected date: 11/14/2024  -     C. trachomatis/N. gonorrhoeae by AMP DNA [facility]; Urine; Future; Expected date: 11/14/2024    Family history of colon cancer  Discussed importance of doing colonoscopy at age 45 due to family history of colon cancer instead of cologuard or stool card   Educated on signs and symptoms that would warrant earlier colonoscopy screening, including blood in stool, unintentional weight loss, or black tarry stools.    FOLLOW-UP:  Follow up in 1 year for routine checkup.  Contact the office for any concerning symptoms or immediate health issues before the next annual visit.      Counseled on age and gender appropriate medical preventative services, including cancer screenings, immunizations, overall nutritional health, need for a consistent exercise regimen and an overall push towards maintaining a vigorous and active lifestyle.      Leah Bui M.D.  11/14/2024    This note was created using [employer] voice recognition software that occasionally misinterprets phrases or words

## 2024-11-15 ENCOUNTER — PATIENT OUTREACH (OUTPATIENT)
Dept: ADMINISTRATIVE | Facility: HOSPITAL | Age: 39
End: 2024-11-15
Payer: COMMERCIAL

## 2024-11-15 ENCOUNTER — PATIENT MESSAGE (OUTPATIENT)
Dept: FAMILY MEDICINE | Facility: CLINIC | Age: 39
End: 2024-11-15
Payer: COMMERCIAL

## 2024-11-15 DIAGNOSIS — R17 HIGH BILIRUBIN: Primary | ICD-10-CM

## 2024-11-15 LAB
HAV IGM SERPL QL IA: NORMAL
HBV CORE IGM SERPL QL IA: NORMAL
HBV SURFACE AG SERPL QL IA: NORMAL
HCV AB SERPL QL IA: NORMAL
HIV 1+2 AB+HIV1 P24 AG SERPL QL IA: NORMAL
T4 FREE SERPL-MCNC: 1.01 NG/DL (ref 0.71–1.51)
TREPONEMA PALLIDUM IGG+IGM AB [PRESENCE] IN SERUM OR PLASMA BY IMMUNOASSAY: NONREACTIVE
TSH SERPL DL<=0.005 MIU/L-ACNC: 1.41 UIU/ML (ref 0.4–4)

## 2024-12-19 ENCOUNTER — LAB VISIT (OUTPATIENT)
Dept: LAB | Facility: HOSPITAL | Age: 39
End: 2024-12-19
Attending: STUDENT IN AN ORGANIZED HEALTH CARE EDUCATION/TRAINING PROGRAM
Payer: COMMERCIAL

## 2024-12-19 DIAGNOSIS — R17 HIGH BILIRUBIN: ICD-10-CM

## 2024-12-19 LAB
ALBUMIN SERPL BCP-MCNC: 4.1 G/DL (ref 3.5–5.2)
ALP SERPL-CCNC: 49 U/L (ref 40–150)
ALT SERPL W/O P-5'-P-CCNC: 16 U/L (ref 10–44)
ANION GAP SERPL CALC-SCNC: 7 MMOL/L (ref 8–16)
AST SERPL-CCNC: 16 U/L (ref 10–40)
BILIRUB DIRECT SERPL-MCNC: 0.5 MG/DL (ref 0.1–0.3)
BILIRUB SERPL-MCNC: 1.3 MG/DL (ref 0.1–1)
BUN SERPL-MCNC: 13 MG/DL (ref 6–20)
CALCIUM SERPL-MCNC: 8.8 MG/DL (ref 8.7–10.5)
CHLORIDE SERPL-SCNC: 107 MMOL/L (ref 95–110)
CO2 SERPL-SCNC: 25 MMOL/L (ref 23–29)
CREAT SERPL-MCNC: 0.9 MG/DL (ref 0.5–1.4)
EST. GFR  (NO RACE VARIABLE): >60 ML/MIN/1.73 M^2
GLUCOSE SERPL-MCNC: 80 MG/DL (ref 70–110)
HAPTOGLOB SERPL-MCNC: 15 MG/DL (ref 30–250)
LDH SERPL L TO P-CCNC: 171 U/L (ref 110–260)
POTASSIUM SERPL-SCNC: 3.7 MMOL/L (ref 3.5–5.1)
PROT SERPL-MCNC: 7.1 G/DL (ref 6–8.4)
RETICS/RBC NFR AUTO: 2.4 % (ref 0.5–2.5)
SODIUM SERPL-SCNC: 139 MMOL/L (ref 136–145)

## 2024-12-19 PROCEDURE — 80053 COMPREHEN METABOLIC PANEL: CPT | Performed by: STUDENT IN AN ORGANIZED HEALTH CARE EDUCATION/TRAINING PROGRAM

## 2024-12-19 PROCEDURE — 85045 AUTOMATED RETICULOCYTE COUNT: CPT | Performed by: STUDENT IN AN ORGANIZED HEALTH CARE EDUCATION/TRAINING PROGRAM

## 2024-12-19 PROCEDURE — 82248 BILIRUBIN DIRECT: CPT | Performed by: STUDENT IN AN ORGANIZED HEALTH CARE EDUCATION/TRAINING PROGRAM

## 2024-12-19 PROCEDURE — 83615 LACTATE (LD) (LDH) ENZYME: CPT | Performed by: STUDENT IN AN ORGANIZED HEALTH CARE EDUCATION/TRAINING PROGRAM

## 2024-12-19 PROCEDURE — 82955 ASSAY OF G6PD ENZYME: CPT | Performed by: STUDENT IN AN ORGANIZED HEALTH CARE EDUCATION/TRAINING PROGRAM

## 2024-12-19 PROCEDURE — 83010 ASSAY OF HAPTOGLOBIN QUANT: CPT | Performed by: STUDENT IN AN ORGANIZED HEALTH CARE EDUCATION/TRAINING PROGRAM

## 2024-12-19 PROCEDURE — 36415 COLL VENOUS BLD VENIPUNCTURE: CPT | Mod: PO | Performed by: STUDENT IN AN ORGANIZED HEALTH CARE EDUCATION/TRAINING PROGRAM

## 2024-12-23 LAB — G6PD RBC-CCNT: 10.4 U/G HB (ref 8–11.9)

## 2025-08-26 ENCOUNTER — PATIENT OUTREACH (OUTPATIENT)
Dept: ADMINISTRATIVE | Facility: HOSPITAL | Age: 40
End: 2025-08-26
Payer: COMMERCIAL

## 2025-08-26 DIAGNOSIS — Z12.31 OTHER SCREENING MAMMOGRAM: ICD-10-CM

## 2025-09-05 ENCOUNTER — HOSPITAL ENCOUNTER (OUTPATIENT)
Dept: RADIOLOGY | Facility: HOSPITAL | Age: 40
Discharge: HOME | End: 2025-09-05
Attending: STUDENT IN AN ORGANIZED HEALTH CARE EDUCATION/TRAINING PROGRAM
Payer: COMMERCIAL

## 2025-09-05 DIAGNOSIS — Z12.31 OTHER SCREENING MAMMOGRAM: ICD-10-CM

## 2025-09-05 PROCEDURE — 77063 BREAST TOMOSYNTHESIS BI: CPT | Mod: TC,PO
